# Patient Record
Sex: FEMALE | Race: WHITE | NOT HISPANIC OR LATINO | Employment: FULL TIME | URBAN - METROPOLITAN AREA
[De-identification: names, ages, dates, MRNs, and addresses within clinical notes are randomized per-mention and may not be internally consistent; named-entity substitution may affect disease eponyms.]

---

## 2021-12-17 PROCEDURE — U0004 COV-19 TEST NON-CDC HGH THRU: HCPCS | Performed by: FAMILY MEDICINE

## 2024-07-24 ENCOUNTER — TELEPHONE (OUTPATIENT)
Age: 54
End: 2024-07-24

## 2024-07-24 NOTE — TELEPHONE ENCOUNTER
[facility] CALL[facility]. THEY NEED PT'S LAST CHART NOTE SENT TO THEM FOR AN ENBREL PA    FAX: [phone]

## 2024-07-30 ENCOUNTER — SPECIALTY PHARMACY (OUTPATIENT)
Age: 54
End: 2024-07-30
Payer: COMMERCIAL

## 2024-07-30 ENCOUNTER — TELEPHONE (OUTPATIENT)
Age: 54
End: 2024-07-30
Payer: COMMERCIAL

## 2024-07-30 DIAGNOSIS — L40.59 POLYARTICULAR PSORIATIC ARTHRITIS: Primary | ICD-10-CM

## 2024-07-31 ENCOUNTER — TELEPHONE (OUTPATIENT)
Age: 54
End: 2024-07-31
Payer: COMMERCIAL

## 2024-09-09 ENCOUNTER — LAB (OUTPATIENT)
Facility: HOSPITAL | Age: 54
End: 2024-09-09
Payer: COMMERCIAL

## 2024-09-09 ENCOUNTER — OFFICE VISIT (OUTPATIENT)
Age: 54
End: 2024-09-09
Payer: COMMERCIAL

## 2024-09-09 VITALS
SYSTOLIC BLOOD PRESSURE: 130 MMHG | HEART RATE: 73 BPM | TEMPERATURE: 97.8 F | DIASTOLIC BLOOD PRESSURE: 84 MMHG | HEIGHT: 63 IN | WEIGHT: 159 LBS | BODY MASS INDEX: 28.17 KG/M2

## 2024-09-09 DIAGNOSIS — L40.50 PSORIATIC ARTHRITIS OF MULTIPLE JOINTS: Primary | ICD-10-CM

## 2024-09-09 DIAGNOSIS — D84.821 IMMUNOSUPPRESSION DUE TO DRUG THERAPY: ICD-10-CM

## 2024-09-09 DIAGNOSIS — L40.50 PSORIATIC ARTHRITIS OF MULTIPLE JOINTS: ICD-10-CM

## 2024-09-09 DIAGNOSIS — Z79.899 HIGH RISK MEDICATION USE: ICD-10-CM

## 2024-09-09 DIAGNOSIS — Z79.899 IMMUNOSUPPRESSION DUE TO DRUG THERAPY: ICD-10-CM

## 2024-09-09 DIAGNOSIS — L40.9 PSORIASIS: ICD-10-CM

## 2024-09-09 LAB
BASOPHILS # BLD AUTO: 0.07 10*3/MM3 (ref 0–0.2)
BASOPHILS NFR BLD AUTO: 1.1 % (ref 0–1.5)
DEPRECATED RDW RBC AUTO: 42.5 FL (ref 37–54)
EOSINOPHIL # BLD AUTO: 0.27 10*3/MM3 (ref 0–0.4)
EOSINOPHIL NFR BLD AUTO: 4.1 % (ref 0.3–6.2)
ERYTHROCYTE [DISTWIDTH] IN BLOOD BY AUTOMATED COUNT: 12.8 % (ref 12.3–15.4)
ERYTHROCYTE [SEDIMENTATION RATE] IN BLOOD: 8 MM/HR (ref 0–30)
HCT VFR BLD AUTO: 39 % (ref 34–46.6)
HGB BLD-MCNC: 13.1 G/DL (ref 12–15.9)
IMM GRANULOCYTES # BLD AUTO: 0.04 10*3/MM3 (ref 0–0.05)
IMM GRANULOCYTES NFR BLD AUTO: 0.6 % (ref 0–0.5)
LYMPHOCYTES # BLD AUTO: 1.95 10*3/MM3 (ref 0.7–3.1)
LYMPHOCYTES NFR BLD AUTO: 29.8 % (ref 19.6–45.3)
MCH RBC QN AUTO: 31 PG (ref 26.6–33)
MCHC RBC AUTO-ENTMCNC: 33.6 G/DL (ref 31.5–35.7)
MCV RBC AUTO: 92.2 FL (ref 79–97)
MONOCYTES # BLD AUTO: 0.62 10*3/MM3 (ref 0.1–0.9)
MONOCYTES NFR BLD AUTO: 9.5 % (ref 5–12)
NEUTROPHILS NFR BLD AUTO: 3.59 10*3/MM3 (ref 1.7–7)
NEUTROPHILS NFR BLD AUTO: 54.9 % (ref 42.7–76)
NRBC BLD AUTO-RTO: 0 /100 WBC (ref 0–0.2)
PLATELET # BLD AUTO: 289 10*3/MM3 (ref 140–450)
PMV BLD AUTO: 10.8 FL (ref 6–12)
RBC # BLD AUTO: 4.23 10*6/MM3 (ref 3.77–5.28)
WBC NRBC COR # BLD AUTO: 6.54 10*3/MM3 (ref 3.4–10.8)

## 2024-09-09 PROCEDURE — 99214 OFFICE O/P EST MOD 30 MIN: CPT | Performed by: INTERNAL MEDICINE

## 2024-09-09 PROCEDURE — 80053 COMPREHEN METABOLIC PANEL: CPT

## 2024-09-09 PROCEDURE — 36415 COLL VENOUS BLD VENIPUNCTURE: CPT

## 2024-09-09 PROCEDURE — 85025 COMPLETE CBC W/AUTO DIFF WBC: CPT

## 2024-09-09 PROCEDURE — 86140 C-REACTIVE PROTEIN: CPT

## 2024-09-09 PROCEDURE — 85652 RBC SED RATE AUTOMATED: CPT

## 2024-09-09 RX ORDER — FLUTICASONE PROPIONATE 50 MCG
SPRAY, SUSPENSION (ML) NASAL
COMMUNITY
Start: 2024-08-30

## 2024-09-09 RX ORDER — MEDROXYPROGESTERONE ACETATE 150 MG/ML
50 INJECTION, SUSPENSION INTRAMUSCULAR WEEKLY
Qty: 4.2 ML | Refills: 5 | Status: SHIPPED | OUTPATIENT
Start: 2024-09-09

## 2024-09-09 RX ORDER — IBUPROFEN 600 MG/1
TABLET, FILM COATED ORAL
COMMUNITY
Start: 2024-08-30

## 2024-09-09 NOTE — ASSESSMENT & PLAN NOTE
dz since 2006; involvement left hand, wrist, neck, back, right knee; [facility]  **Current:  enbrel 3.15, ibuprofen  prior mtx 2008 briefly; prior heavy alcohol through 2017  prior ssz 11.    Low disease activity.   sjc 0 tjc 0 Good prognosis  Continue on Enbrel which has been very helpful.  Well tolerated.  No side effects  She would eventually like to stop Enbrel or switch biologic to Humira down the road.  She will think this over and let us know if she wants us to get Humira approved to replace the Enbrel I think she is going to need long-term biologic given the historical severity of her disease.  She is trying to make lifestyle changes such as eating Mediterranean diet.    We discussed that she would need to remain on some type of DMARD/biologic treatment to slow down/prevent damage to joints.  Labs today for monitoring and every 6 months  continue ibuprofen prn    No sign of toxicity clinically on labs from 6 months ago reviewed  New lab order given to do today.  Follow-up in 6 months

## 2024-09-09 NOTE — PROGRESS NOTES
Office Follow Up      Date: 09/09/2024   Patient Name: Brittny Khan  MRN: [id number]  YOB: 1970    Referring Physician: No ref. provider found     Chief Complaint:   Chief Complaint   Patient presents with    Follow-up       History of Present Illness: Brittny Khan is a 54 y.o. female who is here today for follow up on psoriatic arthritis.      She continues on Enbrel.  Her joints are definitely much better on Enbrel. No side effects.  No infections recently.  Her psoriasis has cleared up on Enbrel.    She stopped SSZ on her own in 2020.  Doing fine without it.  Her muscles cramp at times.  Her neck is painful and stiff.      Subjective       Review of Systems: Review of Systems   Constitutional:  Negative for chills, fatigue, fever and unexpected weight loss.   HENT:  Negative for mouth sores, sinus pressure and sore throat.         Nasal drainage     Eyes:  Negative for pain and redness.   Respiratory:  Negative for cough and shortness of breath.    Cardiovascular:  Negative for chest pain.   Gastrointestinal:  Negative for abdominal pain, blood in stool, diarrhea, nausea, vomiting and GERD.   Endocrine: Negative for polydipsia and polyuria.   Genitourinary:  Negative for dysuria, genital sores and hematuria.   Musculoskeletal:  Positive for arthralgias, back pain and joint swelling. Negative for myalgias, neck pain and neck stiffness.        Morning stiffness     Skin:  Negative for rash and bruise.   Allergic/Immunologic: Negative for immunocompromised state.   Neurological:  Negative for seizures, weakness, numbness and memory problem.   Hematological:  Negative for adenopathy. Does not bruise/bleed easily.   Psychiatric/Behavioral:  Negative for depressed mood. The patient is not nervous/anxious.         Medications:   Current Outpatient Medications:     Astepro 205.5 MCG/SPRAY solution nasal spray, SPRAY 2 SPRAYS INTO EACH NOSTRIL ONCE A DAY, Disp: , Rfl:      "clotrimazole-betamethasone (LOTRISONE) 1-0.05 % cream, Apply 1 Application topically to the appropriate area as directed 2 (Two) Times a Day., Disp: , Rfl:     doxycycline (MONODOX) 100 MG capsule, Take 1 capsule by mouth 2 (Two) Times a Day., Disp: 20 capsule, Rfl: 0    Enbrel SureClick 50 MG/ML solution auto-injector, Inject 50 mg under the skin into the appropriate area as directed 1 (One) Time Per Week., Disp: 4.2 mL, Rfl: 5    fluticasone (FLONASE) 50 MCG/ACT nasal spray, SPRAY 1 SPRAY INTO EACH NOSTRIL EVERY DAY FOR 30 DAYS, Disp: , Rfl:     ibuprofen (ADVIL,MOTRIN) 600 MG tablet, TAKE 1 TABLET BY MOUTH THREE TIMES A DAY WITH FOOD AS NEEDED FOR JOINT PAIN, Disp: , Rfl:     Allergies:   Allergies   Allergen Reactions    Penicillins Hives       I have reviewed and updated the patient's chief complaint, history of present illness, review of systems, past medical history, surgical history, family history, social history, medications and allergy list as appropriate.     Objective        Vital Signs:   Vitals:    09/09/24 1456   BP: 130/84   BP Location: Left arm   Patient Position: Sitting   Cuff Size: Adult   Pulse: 73   Temp: 97.8 °F (36.6 °C)   Weight: 72.1 kg (159 lb)   Height: 160 cm (63\")   PainSc:   5   PainLoc: Generalized     Body mass index is 28.17 kg/m².      Physical Exam:  Physical Exam   MUSCULOSKELETAL:   No peripheral synovitis  Left hand with chronic stable deformities, left wrist fused    Complete joint exam was performed including the MCPs, PIPs, DIPs of the hands, wrists, elbows, shoulders, hips, knees and ankles.  No soft tissue swelling or tenderness is present except as above.    General: The patient is well-developed and well nourished. Cooperative, alert and oriented. Affect is normal. Hydration appears normal.   HEENT: Normocephalic and atraumatic. No notable alopecia. Lids and conjunctiva are normal. Pupils are equal and sclera are clear. Oropharynx is clear   NECK neck is supple without " "adenopathy, masses or thyromegaly.   CARDIOVASCULAR: Regular rate and rhythm. No murmurs, rubs or gallops   LUNGS: Effort is normal. Lungs are clear bilateral   ABDOMEN: Not examined  EXTREMITIES: Peripheral pulses are intact. No clubbing.   SKIN: No rashes. No subcutaneous nodules. No digital ulcers. No sclerodactyly.   NEUROLOGIC: Gait is normal. Strength testing is normal.  No focal neurologic deficits    Results Review:   Labs:   No results found for: \"GLUCOSE\", \"BUN\", \"CREATININE\", \"EGFRRESULT\", \"EGFR\", \"BCR\", \"K\", \"CO2\", \"CALCIUM\", \"PROTENTOTREF\", \"ALBUMIN\", \"BILITOT\", \"AST\", \"ALT\"  Lab Results   Component Value Date    WBC 7.51 06/24/2023    HGB 13.0 06/24/2023    HCT 39.1 06/24/2023    MCV 90 06/24/2023     06/24/2023     No results found for: \"SEDRATE\"  No results found for: \"CRP\"  No results found for: \"QUANTIFERO\", \"QUANTITB1\", \"QUANTITB2\", \"QUANTIFERN\", \"QUANTIFERM\", \"QUANTITBGLDP\"  No results found for: \"RF\"  No results found for: \"HEPBSAG\", \"HEPAIGM\", \"HEPBIGMCORE\", \"HEPCVIRUSABY\"      Procedures    Assessment / Plan        Assessment & Plan  Psoriatic arthritis of multiple joints  dz since 2006; involvement left hand, wrist, neck, back, right knee; [facility]  **Current:  enbrel 3.15, ibuprofen  prior mtx 2008 briefly; prior heavy alcohol through 2017  prior ssz 11.    Low disease activity.   sjc 0 tjc 0 Good prognosis  Continue on Enbrel which has been very helpful.  Well tolerated.  No side effects  She would eventually like to stop Enbrel or switch biologic to Humira down the road.  She will think this over and let us know if she wants us to get Humira approved to replace the Enbrel I think she is going to need long-term biologic given the historical severity of her disease.  She is trying to make lifestyle changes such as eating Mediterranean diet.    We discussed that she would need to remain on some type of DMARD/biologic treatment to slow down/prevent damage to joints.  Labs today " for monitoring and every 6 months  continue ibuprofen prn    No sign of toxicity clinically on labs from 6 months ago reviewed  New lab order given to do today.  Follow-up in 6 months    Psoriasis  Resolved on Enbrel    High risk medication use  Enbrel  Hepatitis panel - 6/10/2022  QuantiFERON - 3/7/2024    Well tolerated    We discussed biologic agents at length. Risks and alternatives were discussed at length and the option of no treatment was also given. We discussed risks including but not limited to infections which can be unusual, severe, and deadly. When possible, these agents should be stopped immediately if infections occur. Unusual infection such as TB and fungal infections can occur. There may be an increased risk of lymphoma with these agents. Other risks can include a multiple sclerosis-like illness and worsening of heart failure. Infusion or injection reactions which can be deadly have been reported. Studies on pregnancy have not been done so pregnancy should be avoided while on these agents. Reactivation of a deadly brain virus and hepatitis viruses have been reported. Worsening of COPD has been seen with orencia. Elevated lipids, elevation in liver functions, and dangerous changes in blood counts have been seen with certain agents. Regular monitoring will be required  Immunosuppression due to drug therapy          Orders Placed This Encounter   Procedures    CBC Auto Differential    Comprehensive Metabolic Panel    C-reactive Protein    Sedimentation Rate     New Medications Ordered This Visit   Medications    Enbrel SureClick 50 MG/ML solution auto-injector     Sig: Inject 50 mg under the skin into the appropriate area as directed 1 (One) Time Per Week.     Dispense:  4.2 mL     Refill:  5           Follow Up:   Return in about 6 months (around 3/9/2025).        Azar Villanueva MD  [facility]

## 2024-09-09 NOTE — ASSESSMENT & PLAN NOTE
Enbrel  Hepatitis panel - 6/10/2022  QuantiFERON - 3/7/2024    Well tolerated    We discussed biologic agents at length. Risks and alternatives were discussed at length and the option of no treatment was also given. We discussed risks including but not limited to infections which can be unusual, severe, and deadly. When possible, these agents should be stopped immediately if infections occur. Unusual infection such as TB and fungal infections can occur. There may be an increased risk of lymphoma with these agents. Other risks can include a multiple sclerosis-like illness and worsening of heart failure. Infusion or injection reactions which can be deadly have been reported. Studies on pregnancy have not been done so pregnancy should be avoided while on these agents. Reactivation of a deadly brain virus and hepatitis viruses have been reported. Worsening of COPD has been seen with orencia. Elevated lipids, elevation in liver functions, and dangerous changes in blood counts have been seen with certain agents. Regular monitoring will be required

## 2024-09-10 ENCOUNTER — SPECIALTY PHARMACY (OUTPATIENT)
Age: 54
End: 2024-09-10
Payer: COMMERCIAL

## 2024-09-10 LAB
ALBUMIN SERPL-MCNC: 4.1 G/DL (ref 3.5–5.2)
ALBUMIN/GLOB SERPL: 1.4 G/DL
ALP SERPL-CCNC: 72 U/L (ref 39–117)
ALT SERPL W P-5'-P-CCNC: 15 U/L (ref 1–33)
ANION GAP SERPL CALCULATED.3IONS-SCNC: 10.8 MMOL/L (ref 5–15)
AST SERPL-CCNC: 17 U/L (ref 1–32)
BILIRUB SERPL-MCNC: 0.2 MG/DL (ref 0–1.2)
BUN SERPL-MCNC: 18 MG/DL (ref 6–20)
BUN/CREAT SERPL: 24.7 (ref 7–25)
CALCIUM SPEC-SCNC: 8.8 MG/DL (ref 8.6–10.5)
CHLORIDE SERPL-SCNC: 104 MMOL/L (ref 98–107)
CO2 SERPL-SCNC: 23.2 MMOL/L (ref 22–29)
CREAT SERPL-MCNC: 0.73 MG/DL (ref 0.57–1)
CRP SERPL-MCNC: <0.3 MG/DL (ref 0–0.5)
EGFRCR SERPLBLD CKD-EPI 2021: 99.1 ML/MIN/1.73
GLOBULIN UR ELPH-MCNC: 2.9 GM/DL
GLUCOSE SERPL-MCNC: 96 MG/DL (ref 65–99)
POTASSIUM SERPL-SCNC: 4.2 MMOL/L (ref 3.5–5.2)
PROT SERPL-MCNC: 7 G/DL (ref 6–8.5)
SODIUM SERPL-SCNC: 138 MMOL/L (ref 136–145)

## 2024-11-25 RX ORDER — IBUPROFEN 600 MG/1
TABLET, FILM COATED ORAL
Qty: 90 TABLET | Refills: 3 | Status: SHIPPED | OUTPATIENT
Start: 2024-11-25

## 2024-12-27 ENCOUNTER — TELEPHONE (OUTPATIENT)
Age: 54
End: 2024-12-27
Payer: COMMERCIAL

## 2024-12-27 NOTE — TELEPHONE ENCOUNTER
Fax sent to [facility] waiting on determination.    Kiara Clark, PharmD, BCPS  Clinical Specialty Pharmacist, Rheumatology   12/27/2024  12:52 EST

## 2024-12-30 ENCOUNTER — SPECIALTY PHARMACY (OUTPATIENT)
Age: 54
End: 2024-12-30
Payer: COMMERCIAL

## 2025-03-04 PROBLEM — R53.83 OTHER FATIGUE: Status: ACTIVE | Noted: 2025-03-04

## 2025-07-09 DIAGNOSIS — Z79.899 HIGH RISK MEDICATION USE: ICD-10-CM

## 2025-07-09 DIAGNOSIS — L40.50 PSORIATIC ARTHRITIS OF MULTIPLE JOINTS: ICD-10-CM

## 2025-07-11 RX ORDER — MEDROXYPROGESTERONE ACETATE 150 MG/ML
INJECTION, SUSPENSION INTRAMUSCULAR
Qty: 4 ML | Refills: 5 | Status: SHIPPED | OUTPATIENT
Start: 2025-07-11

## 2025-07-11 NOTE — TELEPHONE ENCOUNTER
[facility] Patient Management Program  Per Protocol Prescription Order/Refill     Patient currently fills medications at Other and is not enrolled in an Rheumatology Patient Management Program.     Requested Prescriptions     Signed Prescriptions Disp Refills    Enbrel SureClick 50 MG/ML solution auto-injector 4 mL 5     Sig: INJECT 50MG UNDER THE SKIN ONCE WEEKLY     Authorizing Provider: CHUY FARRAR     Ordering User: MELVA RENO     Prescription orders above were sent to the pharmacy per Collaborative Care Agreement Protocol.         ,

## 2025-07-17 ENCOUNTER — TELEPHONE (OUTPATIENT)
Age: 55
End: 2025-07-17
Payer: COMMERCIAL

## 2025-07-17 NOTE — TELEPHONE ENCOUNTER
File Link    Scan on 7/17/2025 0957 by [employer], [place]:  [facility],7/17/25        Key Information    Document ID File Type Document Type Description   [id number] Image REFERRAL/PRE-AUTH CSN - SCAN  [facility],7/17/25     Import Information    Attached At Date Time User Dept   Encounter Level 7/17/2025  9:57 AM [employer], [place]      Encounter    Scanned Document on 7/17/25 with [employer], [place]

## 2025-07-18 ENCOUNTER — SPECIALTY PHARMACY (OUTPATIENT)
Age: 55
End: 2025-07-18
Payer: COMMERCIAL

## 2025-07-18 NOTE — TELEPHONE ENCOUNTER
PA request has been approved and pharmacy notified (Filling pharmacy will be notified by phone, fax, or submitted prescription)    Authorized Medication: Enbrel  Name of Insurance Approving PA:    Pharmacy PA Number: [id number]  PA Effective Dates: 7.31.25-8.30.25  Dispensing Pharmacy: [facility]